# Patient Record
(demographics unavailable — no encounter records)

---

## 2024-11-07 NOTE — PROCEDURE
[Topical Lidocaine] : topical lidocaine [Flexible Endoscope] : examined with the flexible endoscope [Serial Number: ___] : Serial Number: [unfilled] [Image(s) Captured] : image(s) captured and filed [Unable to Cooperate with Mirror] : patient unable to cooperate with mirror [Gag Reflex] : gag reflex preventing mirror examination [Oxymetazoline HCl] : oxymetazoline HCl [de-identified] : Verbal informed consent was obtained from the patient.  Findings: The nasal septum is minimally deviated to the right. There are no masses or polyps, and the nasal mucosa and secretions are normal. The choanae and posterior nasopharynx are normal without masses or drainage. The Eustachian tube orifices appear patent. The pharynx, including the posterior and lateral pharyngeal walls, the vallecula and base of tongue are normal without ulcerations, lesions or masses. The hypopharynx including the pyriform sinuses open well without pooling of secretions, mucosal lesions or masses. The supraglottic larynx including the epiglottis, petiole, arytenoids, glossoepiglottic, aryepiglottic and pharyngoepiglottic folds are normal without mucosal lesions, ulcerations or masses. The glottis reveals normal false vocal folds. The true vocal folds are glistening white, tense and of equal length, without paralysis, having symmetric mobility on adduction and abduction. There are no mucosal lesions, nodules, cysts, erythroplasia or leukoplakia. The posterior cricoid area has healthy pink mucosa in the interarytenoid area and esophageal inlet. There is mild thickening/pachydermia of the interarytenoid mucosa suggestive of posterior laryngitis from laryngopharyngeal acid reflux disease. The trachea is clear without narrowing in the immediate subglottic region and minimally deviated to the right of midline. ------------------------------------------------------------------------------------ [de-identified] : preoperative assessment for a large toxic goiter

## 2024-11-07 NOTE — REASON FOR VISIT
[FreeTextEntry2] : a surgical consultation concerning a toxic goiter now treated with Methimazole for many years.  [FreeTextEntry1] : Referred by Alicia Black MD  Endocrinologist,  PCP is Carina Soler MD

## 2024-11-07 NOTE — DATA REVIEWED
[de-identified] : see HPI  [de-identified] : see HPI  [de-identified] : cytology reports and Endocrinologist's notes reviewed.

## 2024-11-07 NOTE — HISTORY OF PRESENT ILLNESS
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  A nuclear scan in 2018 demonstrated normal homogeneous radioiodine uptake with a negative study for a hot nodule.  Just prior to the pandemic she stopped her Methimazole for approximately 1 year but then developed recurrent symptoms again of hyperthyroidism including palpitations, excess sweating, and malaise.  She resumed taking Methimazole 10 mg x 4 days and 20 mg x 3 days.  Her last TSH in October 2023 was 1.26, free T4 0.8 And .  Her thyroid ultrasound demonstrated a multinodular goiter with a significant enlargement of her thyroid gland with the left lobe measuring over 9 cm in the right lobe approximately 7 cm in greatest dimension.  She also has a history of obstructive sleep apnea and she used a CPAP machine in the past but could not tolerate this.  She underwent an ultrasound-guided FNA biopsy to evaluate a left mid lobe 1.45 cm nodule in December 2022 and this was reported as atypia of undetermined significance, [place] category III but on molecular genomics with the  Afirma GSC benign.  Afirma XA was not performed.  This past August she had another biopsy from a left mid lobe 1.8 cm nodule with benign findings, [place] category II.  A 2.2 cm right midpole nodule was also sampled and also found to be benign, [place] category III.  Laura denies recent shortness of breath, voice changes, dysphagia, anterior neck pain, neck pressure or mass. There is no family history of thyroid cancer. She denies any known radiation exposures in her youth. She is currently euthyroid on Methimazole.  She was offered continued medical management, radioactive iodine and subtotal total thyroidectomy to manage her hypothyroidism, and she is here now referred by her endocrinologist for surgical consultation as she is considering thyroidectomy.  She denies fever, body aches, cough, cyanosis, chest burning, anosmia or recent known COVID exposures.  All family members at home are well.  She is not vaccinated.

## 2024-11-07 NOTE — CONSULT LETTER
[Dear  ___] : Dear  [unfilled], [Consult Letter:] : I had the pleasure of evaluating your patient, [unfilled]. [Please see my note below.] : Please see my note below. [Consult Closing:] : Thank you very much for allowing me to participate in the care of this patient.  If you have any questions, please do not hesitate to contact me. [Sincerely,] : Sincerely, [DrBrayden  ___] : Dr. DO [FreeTextEntry3] :  Peter Mooney M.D., FACS, [facility] Director [facility] Certified in Thyroid/Parathyroid/Neck Ultrasound, LUIS EDUARDO/ BERNABE , Department of Otolaryngology [facility] at [facility]

## 2024-11-07 NOTE — PHYSICAL EXAM
[TextEntry] : Focused Head and Neck Examination:  General Appearance: The patient has a normal appearance (head and face), able to communicate with normal speech and is a well-groomed, well-developed, well-nourished, obese female in no apparent distress.  Breathing was silent and not labored. The patient was alert had normal affect and oriented to person, place, and time. The patient had normal facial and neck skin with normal facial symmetry, strength and motion, no visible/ palpable facial masses or sinus tenderness.  Otologic Exam: The pinnae and bilateral external ear canals were without lesions and clear.  The tympanic membranes were normal bilaterally without perforation and demonstrated normal mobility. There was no evidence of middle ear effusion or infection. Hearing is grossly normal to finger rub.   External Nasal Exam: The external nose was normal in appearance without lesions or deformity.    Intranasal Exam: There were no mucosal lesions, discolorations, nasal polyps, or masses. The nasal septum was intact without perforations. The nasal septum was deviated slightly to the right. The inferior turbinates were normal. The middle turbinates were normal bilaterally.  The middle meatus was clear, and the secretions were normal.  Nasopharynx: There were no visible masses, mucosal ulcerations, or other lesions.  Secretions were normal.   Oral cavity: The patient's lips and vermillion border were normal without lesions, ulcerations or discolorations. Dentition was good for age, the gums were normal, the oral mucosa was normal and there were no lesions, discolorations, ulcerations, erythema, or leukoplakia.  The floor of mouth was without lesions or palpable calculi. The oral tongue has normal mobility, without palpable or visible lesions, ulcerations, nodularity or tenderness.  All surfaces including lateral, ventral and dorsal mucosa examined and palpated. Expressed salivary flow was normal.  Stensens ducts are without palpable calculi.    Oropharynx: The tongue base was without palpable lesions, the soft palate was normal without lesions, the hard palate was normal without lesions, ulcerations, nodularity or discolorations.  The palatine tonsils are present and normal, and the lingual tonsils were mildly hyperplastic.  Pharynx: The lateral and posterior pharyngeal walls were intact without mucosal lesions, discolorations, ulcerations, or masses.    Larynx and Hypopharynx: Flexible fiber optic laryngoscopy was performed with topical anesthesia using 4% lidocaine and 0.5 % Oxymetazoline on cotton pledgets. More details of the exam are outlined in my procedure note but this examination revealed normal, symmetric vocal fold mobility and normal mucosa without vocal fold lesions, discolorations, or ulcerations.  There was slight posterior pachydermia.  The upper airway space is slightly narrowed consistent with a history of obstructive sleep apnea.  The epiglottis and false vocal folds were normal without mucosal lesions. The piriform sinuses opened well and there were no lesions or pooling of saliva. The trachea was clear without narrowing in the immediate subglottic area and slightly deviated to the right from its normal midline position.  Neck Exam: There was no palpable lymphadenopathy or bruits in the central or lateral yudy drainage basins levels I-VI.  There was no asymmetry, pulsatile masses or nodules. The parotid and submandibular glands were not enlarged or tender and without palpable nodules or mass.  The temporomandibular joints were normal bilaterally without deviation/subluxation/click/tenderness or crepitus to palpation.    Thyroid Examination: The thyroid gland is diffusely enlarged left lobe greater than right and nodular.  The trachea is slightly deviated to the right of midline.  Neurological Exam: Cranial nerves II through XII were intact.  There was no visible tremor. Gross motor and sensory functions are normal.  A Chvostek sign was slightly positive.  Ocular Exam: Pupils were equal and responsive to light.  Extraocular movements and gaze were normal. The sclera and conjunctiva were normal and anicteric.  Nystagmus was absent. No sign of erythema, ulcerations or lesions.    Respiratory: Respiratory effort is normal with symmetric chest expansion and no retractions or use of accessory muscles.    Cardiovascular: Extremities are normal with strong pulses, normal temperature, and no edema.

## 2025-01-14 NOTE — REVIEW OF SYSTEMS
[Fatigue] : no fatigue [Dysphagia] : no dysphagia [Neck Pain] : no neck pain [Chest Pain] : no chest pain [Palpitations] : no palpitations [Shortness Of Breath] : no shortness of breath [Cold Intolerance] : no cold intolerance [Heat Intolerance] : no heat intolerance [Negative] : Genitourinary

## 2025-01-14 NOTE — DATA REVIEWED
[FreeTextEntry1] : 11/21/24 TSH 1.48, FT4 0.8 iPTH 58 CMP with gluc 96, Creat 0.87, eGFR 74, AST/ALT 13/12 and calcium 9.4

## 2025-01-14 NOTE — PHYSICAL EXAM
[Alert] : alert [No Acute Distress] : no acute distress [Normal Voice/Communication] : normal voice communication [Normal Hearing] : hearing was normal [No Respiratory Distress] : no respiratory distress [Normal Rate and Effort] : normal respiratory rate and effort [Oriented x3] : oriented to person, place, and time [Normal Affect] : the affect was normal [Normal Insight/Judgement] : insight and judgment were intact [Normal Mood] : the mood was normal [de-identified] : PE limited given nature of TEB [de-identified] : Effort appears normal

## 2025-01-14 NOTE — HISTORY OF PRESENT ILLNESS
[FreeTextEntry1] : Ms. JAMES is a 65 year female with pmhx of toxic MNG, obesity and prediabetes who presents for follow-up.  Patient of Dr. Black, last visit 2/28/24 Last visit with myself, 4/28/2023  Interval change: Since last visit, had FU with Dr. Black in 2/2024 and had repeat thyroid US performed since this visit with subsequent FNA of nodule 5, left, mid currently 1.5 x 1.3 x 1.8cm TR4.  Radiologist, Dr Anders Dewey recommended considering re-biopsy of additional nodule previously bx in 2022, referred for surgical management at that time Had consult with Dr Mooney in 11/2024 for surgical management of toxic nodular goiter.  Surgery previously scheduled in December 2024, but not yet performed as patient preferred to defer until after Cruise which will be end of February for 3 weeks. Per patient, she continues to plan to schedule surgery upon return from her trip Most recent labs, performed with PCP, Dr Soler in 11/22/2024, reviewed, as below with euthyroid state on current ATD regimen Continues methimazole of 5/10mg alternating regimen Denies hyperthyroid sx   1. Toxic nodular goiter. She was diagnosed with toxic nodular goiter around 2011. She had been on methimazole 5 mg daily for many years. Methimazole was stopped for a few months, but her thyroid tests worsened off therapy and she was restarted about two months before her initial visit with me. I advised 5 mg every other day in June 2018. She was subsequently off for many months as a trial off therapy, and restarted on 5 mg daily a week before her appointment with me in July 2019. In August 2019 we adjusted methimazole to 5 mg alternating with 10 mg daily. She is currently taking methimazole 5 mg alternating with 10 mg daily. Thyroid ultrasound in July 2018 showed multiple bilateral nodules, with a right lower pole 1.5 x 1.1 x 1.3 cm nodule meeting criteria for biopsy if not hyperfunctioning. I-123 thyroid uptake and scan in September 2018 showed 29.2% homogenous uptake. Cytopathology from fine needle aspiration of the right nodule benign (Angelica II) in October 2018. Cytopathology from fine needle aspiration of the right mid to superior nodule and two left mid-lobe nodules benign (Angelica II) in August 2019. No history of radiation exposure. Her daughter was diagnosed with thyroid disease during pregnancy. No family history of thyroid cancer.  2. Elevated body mass index.  Comorbidity of prediabetes She states she has always been "big boned." She states she was a size 9-10 around age 22. Her highest weight was 316 pounds in June 2018. She was working with a nutritionist but not recently. She was briefly on phentermine in the past.

## 2025-01-14 NOTE — HISTORY OF PRESENT ILLNESS
[FreeTextEntry1] : Ms. JAMES is a 65 year female with pmhx of toxic MNG, obesity and prediabetes who presents for follow-up.  Patient of Dr. Black, last visit 2/28/24 Last visit with myself, 4/28/2023  Interval change: Since last visit, had FU with Dr. Black in 2/2024 and had repeat thyroid US performed since this visit with subsequent FNA of nodule 5, left, mid currently 1.5 x 1.3 x 1.8cm TR4.  Radiologist, Dr Anders Dewey recommended considering re-biopsy of additional nodule previously bx in 2022, referred for surgical management at that time Had consult with Dr Mooney in 11/2024 for surgical management of toxic nodular goiter.  Surgery previously scheduled in December 2024, but not yet performed as patient preferred to defer until after Cruise which will be end of February for 3 weeks. Per patient, she continues to plan to schedule surgery upon return from her trip Most recent labs, performed with PCP, Dr Soler in 11/22/2024, reviewed, as below with euthyroid state on current ATD regimen Continues methimazole of 5/10mg alternating regimen Denies hyperthyroid sx   1. Toxic nodular goiter. She was diagnosed with toxic nodular goiter around 2011. She had been on methimazole 5 mg daily for many years. Methimazole was stopped for a few months, but her thyroid tests worsened off therapy and she was restarted about two months before her initial visit with me. I advised 5 mg every other day in June 2018. She was subsequently off for many months as a trial off therapy, and restarted on 5 mg daily a week before her appointment with me in July 2019. In August 2019 we adjusted methimazole to 5 mg alternating with 10 mg daily. She is currently taking methimazole 5 mg alternating with 10 mg daily. Thyroid ultrasound in July 2018 showed multiple bilateral nodules, with a right lower pole 1.5 x 1.1 x 1.3 cm nodule meeting criteria for biopsy if not hyperfunctioning. I-123 thyroid uptake and scan in September 2018 showed 29.2% homogenous uptake. Cytopathology from fine needle aspiration of the right nodule benign ([place] II) in October 2018. Cytopathology from fine needle aspiration of the right mid to superior nodule and two left mid-lobe nodules benign ([place] II) in August 2019. No history of radiation exposure. Her daughter was diagnosed with thyroid disease during pregnancy. No family history of thyroid cancer.  2. Elevated body mass index.  Comorbidity of prediabetes She states she has always been "big boned." She states she was a size 9-10 around age 22. Her highest weight was 316 pounds in June 2018. She was working with a nutritionist but not recently. She was briefly on phentermine in the past.

## 2025-01-14 NOTE — REASON FOR VISIT
[Follow - Up] : a follow-up visit [Thyroid nodule/ MNG] : thyroid nodule/ MNG [Other Location: e.g. School (Enter Location, City,State)___] : at [unfilled], at the time of the visit. [Medical Office: ([facility])___] : at the medical office located in

## 2025-01-14 NOTE — PHYSICAL EXAM
[Alert] : alert [No Acute Distress] : no acute distress [Normal Voice/Communication] : normal voice communication [Normal Hearing] : hearing was normal [No Respiratory Distress] : no respiratory distress [Normal Rate and Effort] : normal respiratory rate and effort [Oriented x3] : oriented to person, place, and time [Normal Affect] : the affect was normal [Normal Insight/Judgement] : insight and judgment were intact [Normal Mood] : the mood was normal [de-identified] : PE limited given nature of TEB [de-identified] : Effort appears normal

## 2025-01-14 NOTE — REASON FOR VISIT
[Follow - Up] : a follow-up visit [Thyroid nodule/ MNG] : thyroid nodule/ MNG [Other Location: e.g. School (Enter Location, City,State)___] : at [unfilled], at the time of the visit. [Medical Office: ([facility])___] : at the medical office located in

## 2025-01-14 NOTE — ASSESSMENT
[FreeTextEntry1] : 1. Toxic Nodular Goiter Dx in 2011 Current regimen: Methimazole 5/10mg alternating 11/2024 Euthyroid on above regimen H/o Thyroid uptake scan in 9/2018 which showed 29.2% homogenous uptake Most recent thyroid US from 7/18/24 reviewed today S/p FNA in 8/2024 with consideration for repeat FNA of additional nodule, but opting for surgical management Has had consultation for surgical management with Dr. Sierra, which she deferred from December 2024 until her return from her 3 week cruise out of the country and will reschedule upon her return in [place] -- repeat TFTs as has upcoming labs, emailed lab slip to patient today -- continue plan for surgical management with Dr Mooney and recommend FU 6-8 weeks postoperatively  Labs un upcoming weeks, I will call/[facility] message with results Follow up 6-8 weeks postoperatively  Thao Chacon MS, FNP-BC, [facility] 01/14/2025

## 2025-01-14 NOTE — ASSESSMENT
[FreeTextEntry1] : 1. Toxic Nodular Goiter Dx in 2011 Current regimen: Methimazole 5/10mg alternating 11/2024 Euthyroid on above regimen H/o Thyroid uptake scan in 9/2018 which showed 29.2% homogenous uptake Most recent thyroid US from 7/18/24 reviewed today S/p FNA in 8/2024 with consideration for repeat FNA of additional nodule, but opting for surgical management Has had consultation for surgical management with Dr. Sierra, which she deferred from December 2024 until her return from her 3 week cruise out of the country and will reschedule upon her return in [place] -- repeat TFTs as has upcoming labs, emailed lab slip to patient today -- continue plan for surgical management with Dr Mooney and recommend FU 6-8 weeks postoperatively  Labs un upcoming weeks, I will call/[facility] message with results Follow up 6-8 weeks postoperatively  Thao Chacon MS, FNP-BC, [facility] 01/14/2025